# Patient Record
Sex: FEMALE | Race: WHITE | NOT HISPANIC OR LATINO | Employment: OTHER | URBAN - METROPOLITAN AREA
[De-identification: names, ages, dates, MRNs, and addresses within clinical notes are randomized per-mention and may not be internally consistent; named-entity substitution may affect disease eponyms.]

---

## 2019-12-12 ENCOUNTER — HOSPITAL ENCOUNTER (EMERGENCY)
Facility: HOSPITAL | Age: 74
Discharge: HOME | End: 2019-12-12
Attending: FAMILY MEDICINE
Payer: COMMERCIAL

## 2019-12-12 ENCOUNTER — TELEPHONE (OUTPATIENT)
Dept: ORTHOPEDICS | Facility: CLINIC | Age: 74
End: 2019-12-12

## 2019-12-12 VITALS
BODY MASS INDEX: 27.11 KG/M2 | TEMPERATURE: 99 F | HEART RATE: 78 BPM | SYSTOLIC BLOOD PRESSURE: 153 MMHG | DIASTOLIC BLOOD PRESSURE: 92 MMHG | OXYGEN SATURATION: 97 % | RESPIRATION RATE: 20 BRPM | WEIGHT: 183 LBS | HEIGHT: 69 IN

## 2019-12-12 DIAGNOSIS — S49.91XA INJURY OF RIGHT SHOULDER, INITIAL ENCOUNTER: Primary | ICD-10-CM
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
## 2019-12-12 PROCEDURE — 99283 EMERGENCY DEPT VISIT LOW MDM: CPT | Mod: 25

## 2019-12-12 PROCEDURE — 73030 X-RAY EXAM OF SHOULDER: CPT | Mod: 26,RT,, | Performed by: RADIOLOGY

## 2019-12-12 PROCEDURE — 73030 XR SHOULDER COMPLETE 2 OR MORE VIEWS RIGHT: ICD-10-PCS | Mod: 26,RT,, | Performed by: RADIOLOGY

## 2019-12-12 PROCEDURE — 73030 X-RAY EXAM OF SHOULDER: CPT | Mod: TC,FY,RT

## 2019-12-12 NOTE — DISCHARGE INSTRUCTIONS
Naproxen or Motrin for pain    Cont with flexeril as needed    Sling for comfort     Ortho referral

## 2019-12-12 NOTE — ED PROVIDER NOTES
Encounter Date: 12/12/2019       History     Chief Complaint   Patient presents with    Fall     Patient fell 2 days ago, complaining of right shoulder pain.     Marcia Mccarty is a 74 y.o female with breast cancer, depression and hypertension. She presents to ED with complaint of right shoulder pain    Patient reports that she fell out of bed 2 days ago and injured right shoulder    No swelling or obvious dislocation. She has limted ROM. NVI distally    Patient reports previous shoulder injury in October. She reports that she has had pain since. However, pain is worse since recent fall    No fever, chills, chest pain, dyspnea or weakness        Review of patient's allergies indicates:   Allergen Reactions    Codeine      Past Medical History:   Diagnosis Date    Anxiety     Cancer     breast    Depression     Hypertension      Past Surgical History:   Procedure Laterality Date    APPENDECTOMY      BREAST SURGERY      TUBAL LIGATION       History reviewed. No pertinent family history.  Social History     Tobacco Use    Smoking status: Never Smoker   Substance Use Topics    Alcohol use: Never     Frequency: Never    Drug use: Never     Review of Systems   Constitutional: Negative.  Negative for fever.   HENT: Negative.  Negative for sore throat.    Eyes: Negative.    Respiratory: Negative.  Negative for shortness of breath.    Cardiovascular: Negative.  Negative for chest pain.   Gastrointestinal: Negative.  Negative for nausea.   Endocrine: Negative.    Genitourinary: Negative.  Negative for dysuria.   Musculoskeletal: Positive for arthralgias (right shoulder). Negative for back pain.   Skin: Negative for rash.   Allergic/Immunologic: Negative.    Neurological: Negative.  Negative for weakness.   Hematological: Negative.  Does not bruise/bleed easily.   Psychiatric/Behavioral: Negative.    All other systems reviewed and are negative.      Physical Exam     Initial Vitals [12/12/19 1155]   BP Pulse Resp  Temp SpO2   (!) 153/92 78 20 98.7 °F (37.1 °C) 97 %      MAP       --         Physical Exam    Nursing note and vitals reviewed.  Constitutional: She appears well-developed and well-nourished.   HENT:   Head: Normocephalic.   Eyes: Conjunctivae are normal.   Neck: Normal range of motion. Neck supple.   Cardiovascular: Normal rate.   Pulmonary/Chest: Breath sounds normal.   Musculoskeletal: She exhibits tenderness.        Right shoulder: She exhibits decreased range of motion, tenderness, bony tenderness, pain and spasm. She exhibits no swelling, no effusion, no crepitus, no deformity, no laceration, normal pulse and normal strength.   Neurological: She is alert and oriented to person, place, and time. GCS score is 15. GCS eye subscore is 4. GCS verbal subscore is 5. GCS motor subscore is 6.   Skin: Skin is warm.   Psychiatric: She has a normal mood and affect. Her behavior is normal. Judgment and thought content normal.         ED Course   Procedures  Labs Reviewed - No data to display       Imaging Results          X-Ray Shoulder Complete 2 View Right (Final result)  Result time 12/12/19 12:17:31    Final result by Ernesto Peguero MD (12/12/19 12:17:31)                 Impression:      Mild changes of degenerative osteoarthrosis.      Electronically signed by: Ernesto Peguero  Date:    12/12/2019  Time:    12:17             Narrative:    EXAMINATION:  XR SHOULDER COMPLETE 2 OR MORE VIEWS RIGHT    CLINICAL HISTORY:  Unspecified fall, initial encounter    TECHNIQUE:  Two or three views of the right shoulder were performed.    COMPARISON:  None    FINDINGS:  No acute fracture or dislocation.  No significant soft tissue swelling.    Humeral head normally positioned with the glenoid cavity.  Mild glenohumeral degenerative osteoarthrosis with mild joint space narrowing and small osteophyte formation.    AC joint is intact with mild degenerative osteoarthrosis.                                 Medical Decision Making:    Initial Assessment:   Patient with complaint of right shoulder pain    Patient reports that she fell out of bed 2 days ago and injured right shoulder    No swelling or obvious dislocation. She has limted ROM. NVI distally    Patient reports previous shoulder injury in October. She reports that she has had pain since. However, pain is worse since recent fall    No fever, chills, chest pain, dyspnea or weakness    Differential Diagnosis:   Shoulder bone injury, dislocation, strain, rotator cuff injury  ED Management:  XR with no acute finding    Arm sling for comfort    Ortho referral    Discussed physical exam findings with patient  No acute emergent medical condition identified at this time to warrant further testing/diagnostics  At this time, I believe the patient is clinically stable for discharge.   Patient to follow up with PCP in 1-2 days.  The patient acknowledges that close follow up with a MD is required after all ER visits  Pt given instructions; take all medications prescribed in the ER as directed.   Patient agrees to comply with all instruction and direction given in the ER  Pt agrees to return to ER if any symptoms reoccur                                          Clinical Impression:       ICD-10-CM ICD-9-CM   1. Injury of right shoulder, initial encounter S49.91XA 959.2   2. Fall W19.XXXA E888.9                             Magdalene Martines NP  12/12/19 [id number]

## 2019-12-12 NOTE — TELEPHONE ENCOUNTER
Called patient to schedule referred appointment from [facility] with Dr. Mays for treatment of right shoulder pain.     Xray report from today states: No acute fracture or dislocation. No significant soft tissue swelling. AC joint is intact with mild degenerative osteoarthrosis.    Patient declined next available new patient appointment with Dr. Mays. No appointment made. Encouraged patient to call office if she wishes to schedule an appointment with Dr. Mays.